# Patient Record
Sex: FEMALE | Race: WHITE
[De-identification: names, ages, dates, MRNs, and addresses within clinical notes are randomized per-mention and may not be internally consistent; named-entity substitution may affect disease eponyms.]

---

## 2017-01-23 ENCOUNTER — PRIOR ORIGINAL RECORDS (OUTPATIENT)
Dept: OTHER | Age: 67
End: 2017-01-23

## 2017-01-28 ENCOUNTER — PRIOR ORIGINAL RECORDS (OUTPATIENT)
Dept: OTHER | Age: 67
End: 2017-01-28

## 2017-02-03 ENCOUNTER — NURSE ONLY (OUTPATIENT)
Dept: ENDOCRINOLOGY CLINIC | Facility: CLINIC | Age: 67
End: 2017-02-03

## 2017-02-03 VITALS
SYSTOLIC BLOOD PRESSURE: 129 MMHG | WEIGHT: 167.81 LBS | HEIGHT: 65 IN | HEART RATE: 62 BPM | BODY MASS INDEX: 27.96 KG/M2 | DIASTOLIC BLOOD PRESSURE: 70 MMHG

## 2017-02-03 DIAGNOSIS — E11.65 TYPE 2 DIABETES MELLITUS WITH HYPERGLYCEMIA, WITHOUT LONG-TERM CURRENT USE OF INSULIN (HCC): Primary | ICD-10-CM

## 2017-02-03 PROCEDURE — G0108 DIAB MANAGE TRN  PER INDIV: HCPCS

## 2017-02-03 NOTE — PROGRESS NOTES
Michael Galarza  IJB:[birth year] attended Step 4 Diabetic Education:    Date: 2/3/2017       /70 mmHg  Pulse 62  Ht 65\"  Wt 167 lb 12.8 oz  BMI 27.92 kg/m2  Weight Change:  Lost 29 lbs    No results found for: A1C, HGBA1C   (HbA1c reference range:< line.   National Diabetes Education Program website: ndep.nih.gov   [address] [place] or National Diabetes Information Clearinghouse website: diabetes. niddk.nih.gov   Other:      Patient verbalized understanding and has no further questions at this time.   Written mater

## 2017-04-04 ENCOUNTER — NURSE ONLY (OUTPATIENT)
Dept: ENDOCRINOLOGY CLINIC | Facility: CLINIC | Age: 67
End: 2017-04-04

## 2017-04-04 DIAGNOSIS — E11.65 TYPE 2 DIABETES MELLITUS WITH HYPERGLYCEMIA, WITHOUT LONG-TERM CURRENT USE OF INSULIN (HCC): Primary | ICD-10-CM

## 2017-04-04 PROCEDURE — G0108 DIAB MANAGE TRN  PER INDIV: HCPCS

## 2017-04-05 NOTE — PROGRESS NOTES
Met with Michael Auguste for an Advanced Diabetes Class  Reviewed eating out and physical benefits of exercise   Gave written material for all. Reviewed BG levels. She is testing 2 times a day fasting and 2 hours after meals.   All readings are within acceptable

## 2017-08-16 ENCOUNTER — NURSE ONLY (OUTPATIENT)
Dept: ENDOCRINOLOGY CLINIC | Facility: CLINIC | Age: 67
End: 2017-08-16

## 2017-08-16 VITALS — WEIGHT: 180 LBS | BODY MASS INDEX: 30 KG/M2

## 2017-08-16 PROCEDURE — G0108 DIAB MANAGE TRN  PER INDIV: HCPCS

## 2017-08-16 NOTE — PROGRESS NOTES
Advanced Diabetes Part 2  Review balance: timing and methods of actions of diabetes medications including insulin. Review and discuss pattern management: the effects of food and insulin on blood glucose levels.   Introduce insulin pumps: benefits and how

## 2018-06-19 PROBLEM — S83.411A SPRAIN OF MEDIAL COLLATERAL LIGAMENT OF RIGHT KNEE, INITIAL ENCOUNTER: Status: ACTIVE | Noted: 2018-06-19

## 2018-06-19 PROBLEM — M17.11 PRIMARY OSTEOARTHRITIS OF RIGHT KNEE: Status: ACTIVE | Noted: 2018-06-19

## 2018-07-17 PROBLEM — S83.411D SPRAIN OF MEDIAL COLLATERAL LIGAMENT OF RIGHT KNEE, SUBSEQUENT ENCOUNTER: Status: ACTIVE | Noted: 2018-06-19

## 2018-09-27 ENCOUNTER — NURSE ONLY (OUTPATIENT)
Dept: FAMILY MEDICINE CLINIC | Facility: CLINIC | Age: 68
End: 2018-09-27

## 2018-09-27 ENCOUNTER — IMMUNIZATION (OUTPATIENT)
Dept: FAMILY MEDICINE CLINIC | Facility: CLINIC | Age: 68
End: 2018-09-27
Payer: COMMERCIAL

## 2018-09-27 DIAGNOSIS — Z23 NEED FOR VACCINATION: ICD-10-CM

## 2018-09-27 PROCEDURE — G0008 ADMIN INFLUENZA VIRUS VAC: HCPCS | Performed by: NURSE PRACTITIONER

## 2018-09-27 PROCEDURE — 90662 IIV NO PRSV INCREASED AG IM: CPT | Performed by: NURSE PRACTITIONER

## 2019-08-06 DIAGNOSIS — Z12.39 BREAST SCREENING: Primary | ICD-10-CM

## 2019-09-23 PROBLEM — E66.9 CLASS 1 OBESITY IN ADULT: Status: ACTIVE | Noted: 2019-09-23

## 2019-09-23 PROBLEM — E78.2 MIXED HYPERLIPIDEMIA: Status: ACTIVE | Noted: 2019-09-23

## 2019-09-23 PROBLEM — I10 ESSENTIAL HYPERTENSION: Status: ACTIVE | Noted: 2019-09-23

## 2019-09-23 PROBLEM — E11.9 TYPE 2 DIABETES MELLITUS (HCC): Status: ACTIVE | Noted: 2019-09-23

## 2020-02-05 ENCOUNTER — HOSPITAL ENCOUNTER (OUTPATIENT)
Dept: MAMMOGRAPHY | Facility: HOSPITAL | Age: 70
Discharge: HOME | End: 2020-02-05
Attending: NURSE PRACTITIONER
Payer: MEDICARE

## 2020-02-05 DIAGNOSIS — Z12.39 SCREENING FOR BREAST CANCER: ICD-10-CM

## 2020-02-05 PROCEDURE — 77063 BREAST TOMOSYNTHESIS BI: CPT | Performed by: NURSE PRACTITIONER

## 2020-02-05 PROCEDURE — 77067 SCR MAMMO BI INCL CAD: CPT | Performed by: NURSE PRACTITIONER

## 2021-03-08 DIAGNOSIS — Z23 NEED FOR VACCINATION: ICD-10-CM

## 2021-09-08 ENCOUNTER — DIABETIC EDUCATION (OUTPATIENT)
Dept: ENDOCRINOLOGY CLINIC | Facility: CLINIC | Age: 71
End: 2021-09-08
Payer: COMMERCIAL

## 2021-09-08 VITALS — WEIGHT: 190 LBS | BODY MASS INDEX: 32 KG/M2

## 2021-09-08 DIAGNOSIS — E11.65 TYPE 2 DIABETES MELLITUS WITH HYPERGLYCEMIA, WITHOUT LONG-TERM CURRENT USE OF INSULIN (HCC): Primary | ICD-10-CM

## 2021-09-08 PROCEDURE — G0108 DIAB MANAGE TRN  PER INDIV: HCPCS

## 2021-09-08 RX ORDER — METFORMIN HYDROCHLORIDE 500 MG/1
500 TABLET, EXTENDED RELEASE ORAL
COMMUNITY

## 2021-09-08 NOTE — PROGRESS NOTES
Gm Coleman  : 1950 attended individual initial assessment for Diabetes Education:    Date: 2021   Start time: 835 End time: 56    AIC 8% Documents  brought in with patient from [address] [place] [address]: 77 y/o presents for all areas covered. Patient verbalized understanding and has no further questions at this time.     Shilo Stephens RN,MSN

## 2021-10-19 ENCOUNTER — NURSE ONLY (OUTPATIENT)
Dept: ENDOCRINOLOGY CLINIC | Facility: CLINIC | Age: 71
End: 2021-10-19
Payer: COMMERCIAL

## 2021-10-19 DIAGNOSIS — E11.65 TYPE 2 DIABETES MELLITUS WITH HYPERGLYCEMIA, WITHOUT LONG-TERM CURRENT USE OF INSULIN (HCC): ICD-10-CM

## 2021-10-19 PROCEDURE — G0109 DIAB MANAGE TRN IND/GROUP: HCPCS | Performed by: DIETITIAN, REGISTERED

## 2021-10-20 NOTE — PROGRESS NOTES
Billy Winchester  DYN[birth year] attended Step 2 Group Class: Pathophysiology of Diabetes, Types of Diabetes, Sources of Carbohydrate, Blood Glucose Targets, Medications    Date: 10/19/2021  Referring Provider: Dr. Judi Shepard  Start time: 5:30pm End time: 7

## 2021-11-02 ENCOUNTER — NURSE ONLY (OUTPATIENT)
Dept: ENDOCRINOLOGY CLINIC | Facility: CLINIC | Age: 71
End: 2021-11-02
Payer: COMMERCIAL

## 2021-11-02 DIAGNOSIS — E11.65 TYPE 2 DIABETES MELLITUS WITH HYPERGLYCEMIA, WITHOUT LONG-TERM CURRENT USE OF INSULIN (HCC): Primary | ICD-10-CM

## 2021-11-02 PROCEDURE — G0109 DIAB MANAGE TRN IND/GROUP: HCPCS | Performed by: DIETITIAN, REGISTERED

## 2021-11-03 NOTE — PROGRESS NOTES
Anish aLw  JPN[birth year] attended Step 3 Group Class: Carbohydrate Counting, Treating Lows    Date: 11/2/2021  Referring Provider: Dr. Dinh Schmidt  Start time: 5:30 End time: 7:30    The patient participated during the class: asked a lot of good questio

## 2021-11-30 ENCOUNTER — NURSE ONLY (OUTPATIENT)
Dept: ENDOCRINOLOGY CLINIC | Facility: CLINIC | Age: 71
End: 2021-11-30
Payer: COMMERCIAL

## 2021-11-30 DIAGNOSIS — E11.65 TYPE 2 DIABETES MELLITUS WITH HYPERGLYCEMIA, WITHOUT LONG-TERM CURRENT USE OF INSULIN (HCC): Primary | ICD-10-CM

## 2021-11-30 PROCEDURE — G0109 DIAB MANAGE TRN IND/GROUP: HCPCS | Performed by: DIETITIAN, REGISTERED

## 2021-12-01 NOTE — PROGRESS NOTES
Jazz Hansen  QAU[birth year] attended Step 4 Group Class: Reducing Complications, Special Occasion Eating, Treating Hyperglycemia  Date: 11/30/2021  Referring Provider: Dr. Neal Julian  Start time: 5:30 End time: 7:00    The patient participated during the

## 2021-12-07 ENCOUNTER — NURSE ONLY (OUTPATIENT)
Dept: ENDOCRINOLOGY CLINIC | Facility: CLINIC | Age: 71
End: 2021-12-07
Payer: COMMERCIAL

## 2021-12-07 DIAGNOSIS — E11.65 TYPE 2 DIABETES MELLITUS WITH HYPERGLYCEMIA, WITHOUT LONG-TERM CURRENT USE OF INSULIN (HCC): Primary | ICD-10-CM

## 2021-12-07 PROCEDURE — G0109 DIAB MANAGE TRN IND/GROUP: HCPCS | Performed by: DIETITIAN, REGISTERED

## 2021-12-08 NOTE — PROGRESS NOTES
Jamal Urban  YQD[birth year] attended Step 5 Group Class: Heart Healthy Diet, Exercise, Stress Management  Date: 12/7/2021  Referring Provider: Dr. Marin Robins  Start time: 5:30 End time: 7:30    The patient participated during the class: Patient verbalized

## 2022-01-18 ENCOUNTER — NURSE ONLY (OUTPATIENT)
Dept: ENDOCRINOLOGY CLINIC | Facility: CLINIC | Age: 72
End: 2022-01-18
Payer: COMMERCIAL

## 2022-01-18 VITALS — BODY MASS INDEX: 30 KG/M2 | WEIGHT: 178 LBS

## 2022-01-18 DIAGNOSIS — E11.65 TYPE 2 DIABETES MELLITUS WITH HYPERGLYCEMIA, WITHOUT LONG-TERM CURRENT USE OF INSULIN (HCC): Primary | ICD-10-CM

## 2022-01-18 PROCEDURE — G0109 DIAB MANAGE TRN IND/GROUP: HCPCS | Performed by: DIETITIAN, REGISTERED

## 2022-01-19 NOTE — PROGRESS NOTES
Leopoldo Polanco  CGY[birth year]14 attended Step 6 Group Class:  3 Month Follow-Up: Goals, Hgb A1C, Weight    Date:    Referring Provider: Dr. Shelton Irby  Start time:5:30    End time:6:30    The patient attended class in [place] with  Rosalio Pinto.      The pat goals.        [] [employer]      [] Weight Watchers [phone] www. Weightimo.im. [employer]      [] Over-eaters Anonymous [phone] (support group) www.oa. org    Exercise      [] Rubi Christine [phone] [email or web address]

## 2022-04-20 ENCOUNTER — DIABETIC EDUCATION (OUTPATIENT)
Dept: ENDOCRINOLOGY CLINIC | Facility: CLINIC | Age: 72
End: 2022-04-20
Payer: COMMERCIAL

## 2022-04-20 DIAGNOSIS — E11.65 TYPE 2 DIABETES MELLITUS WITH HYPERGLYCEMIA, WITHOUT LONG-TERM CURRENT USE OF INSULIN (HCC): Primary | ICD-10-CM

## 2022-04-20 PROCEDURE — G0109 DIAB MANAGE TRN IND/GROUP: HCPCS | Performed by: DIETITIAN, REGISTERED

## 2022-04-20 NOTE — PROGRESS NOTES
Diabetes Education Follow-up Note    Referring Provider: Dr. Caterina Ford   Start time: 4:30  End time: 5:30    She has been through stress lately as her  (dementia) has been entered into a long-term care facility. Reviewed Type 2 diabetes as a diagnosis. Discussed insulin resistance and tools to treat: diet, regular physical activity, diabetes medications, and stress management. Also discussed natural progression of Type 2 diabetes and ways to slow progression: regular physical activity, good blood glucose control and avoid weight gain/possibly reduce weight. A1C:  Due for A1C update  Discussed target A1C 6.5-6.9%     Weight: down to 172# (!!!!) her lowest for years    Discussed goal to reduce 5-10% of weight or avoid weight gain. Diet: we shared heart healthy recipes!!! Discussed macronutrient balance: reduce starch, include lean protein and non-starchy vegetables, also fruit, yogurt and milk. Discussed heart healthy aspects of diet including low saturated fat/sodium, high fiber. Ex: does legs and core in bed - hasn't resumed walking outdoors yet but weather is improving. Discussed benefits of regular physical activity and goal 30 minutes daily for a minimum of 150 minutes/week throughout the year and 5-7,000 steps daily. DB Meds: glipizide, metformin ER, now on Ozempic. Didn't [place] jardiance/mycotic. BG's:  Tests how often: once/day now going to increase to BID. FBS's 120-160's  Reviewed BG targets: FBS  and two hours post-beginning of meal ideally less than 150 mg/dL. Hypoglycemia?: denies    Patient set diabetes self-management goals: return to BID BG testing, increase regular activity. Patient is willing to make lifestyle changes to improve blood glucose control. Written/emailed materials provided for all topics covered. Patient verbalized understanding and has no further questions at this time.   Thank you for the referral.     Follow-up Plan: f/u class in October  Pt to contact diabetes center with questions/concerns.     Aline Denise MS RD LDN CDCES

## (undated) NOTE — MR AVS SNAPSHOT
[facility],[address], [address] [place] [address]  [phone]               Thank you for choosing us for your health care visit with Joseph Sam RN,CDE.   We are glad to serve you and happy to provide you with multivitamin Tabs   Take 1 tablet by mouth daily. [address] [place]   Take by mouth. [employer] Search     Sign up for [employer], your secure online medical record.   [employer] will allow you to access patient instructions from your recent visit,  vi

## (undated) NOTE — MR AVS SNAPSHOT
[facility],[address], [address] [place] [address] Rd  [phone]               Thank you for choosing us for your health care visit with Ron Porter RN,CDE.   We are glad to serve you and happy to provide you with [employer] will allow you to access patient instructions from your recent visit,  view other health information, and more. To sign up or find more information, go to [email or web address] and click on the Sign Up Now link in the [employer] box.      Enter 2 ½ hours per week – spread out over time Use a bianca to keep you motivated   Don’t forget strength training with weights and resistance Set goals and track your progress   You don’t need to join a gym. Home exercises work great.  Put more priority on exe